# Patient Record
Sex: FEMALE | Race: OTHER | HISPANIC OR LATINO | URBAN - METROPOLITAN AREA
[De-identification: names, ages, dates, MRNs, and addresses within clinical notes are randomized per-mention and may not be internally consistent; named-entity substitution may affect disease eponyms.]

---

## 2018-10-30 ENCOUNTER — EMERGENCY (EMERGENCY)
Facility: HOSPITAL | Age: 53
LOS: 1 days | Discharge: HOME | End: 2018-10-30
Attending: EMERGENCY MEDICINE | Admitting: EMERGENCY MEDICINE
Payer: COMMERCIAL

## 2018-10-30 VITALS
TEMPERATURE: 98 F | WEIGHT: 216.05 LBS | DIASTOLIC BLOOD PRESSURE: 81 MMHG | SYSTOLIC BLOOD PRESSURE: 121 MMHG | HEART RATE: 61 BPM | OXYGEN SATURATION: 100 % | RESPIRATION RATE: 16 BRPM

## 2018-10-30 DIAGNOSIS — E11.9 TYPE 2 DIABETES MELLITUS WITHOUT COMPLICATIONS: ICD-10-CM

## 2018-10-30 DIAGNOSIS — R42 DIZZINESS AND GIDDINESS: ICD-10-CM

## 2018-10-30 DIAGNOSIS — Z79.899 OTHER LONG TERM (CURRENT) DRUG THERAPY: ICD-10-CM

## 2018-10-30 DIAGNOSIS — Z79.84 LONG TERM (CURRENT) USE OF ORAL HYPOGLYCEMIC DRUGS: ICD-10-CM

## 2018-10-30 DIAGNOSIS — E03.9 HYPOTHYROIDISM, UNSPECIFIED: ICD-10-CM

## 2018-10-30 LAB
ALBUMIN SERPL ELPH-MCNC: 4.3 G/DL — SIGNIFICANT CHANGE UP (ref 3.3–5)
ALP SERPL-CCNC: 57 U/L — SIGNIFICANT CHANGE UP (ref 40–120)
ALT FLD-CCNC: 13 U/L — SIGNIFICANT CHANGE UP (ref 10–45)
ANION GAP SERPL CALC-SCNC: 18 MMOL/L — HIGH (ref 5–17)
AST SERPL-CCNC: 12 U/L — SIGNIFICANT CHANGE UP (ref 10–40)
BASOPHILS NFR BLD AUTO: 0.3 % — SIGNIFICANT CHANGE UP (ref 0–2)
BILIRUB SERPL-MCNC: 0.3 MG/DL — SIGNIFICANT CHANGE UP (ref 0.2–1.2)
BUN SERPL-MCNC: 19 MG/DL — SIGNIFICANT CHANGE UP (ref 7–23)
CALCIUM SERPL-MCNC: 10.2 MG/DL — SIGNIFICANT CHANGE UP (ref 8.4–10.5)
CHLORIDE SERPL-SCNC: 100 MMOL/L — SIGNIFICANT CHANGE UP (ref 96–108)
CO2 SERPL-SCNC: 20 MMOL/L — LOW (ref 22–31)
CREAT SERPL-MCNC: 0.78 MG/DL — SIGNIFICANT CHANGE UP (ref 0.5–1.3)
EOSINOPHIL NFR BLD AUTO: 3 % — SIGNIFICANT CHANGE UP (ref 0–6)
GLUCOSE SERPL-MCNC: 135 MG/DL — HIGH (ref 70–99)
HCT VFR BLD CALC: 41.4 % — SIGNIFICANT CHANGE UP (ref 34.5–45)
HGB BLD-MCNC: 13.3 G/DL — SIGNIFICANT CHANGE UP (ref 11.5–15.5)
LYMPHOCYTES # BLD AUTO: 29 % — SIGNIFICANT CHANGE UP (ref 13–44)
MCHC RBC-ENTMCNC: 26.9 PG — LOW (ref 27–34)
MCHC RBC-ENTMCNC: 32.1 G/DL — SIGNIFICANT CHANGE UP (ref 32–36)
MCV RBC AUTO: 83.8 FL — SIGNIFICANT CHANGE UP (ref 80–100)
MONOCYTES NFR BLD AUTO: 8.5 % — SIGNIFICANT CHANGE UP (ref 2–14)
NEUTROPHILS NFR BLD AUTO: 59.2 % — SIGNIFICANT CHANGE UP (ref 43–77)
PLATELET # BLD AUTO: 246 K/UL — SIGNIFICANT CHANGE UP (ref 150–400)
POTASSIUM SERPL-MCNC: 4.2 MMOL/L — SIGNIFICANT CHANGE UP (ref 3.5–5.3)
POTASSIUM SERPL-SCNC: 4.2 MMOL/L — SIGNIFICANT CHANGE UP (ref 3.5–5.3)
PROT SERPL-MCNC: 7.7 G/DL — SIGNIFICANT CHANGE UP (ref 6–8.3)
RBC # BLD: 4.94 M/UL — SIGNIFICANT CHANGE UP (ref 3.8–5.2)
RBC # FLD: 14.4 % — SIGNIFICANT CHANGE UP (ref 10.3–16.9)
SODIUM SERPL-SCNC: 138 MMOL/L — SIGNIFICANT CHANGE UP (ref 135–145)
TROPONIN T SERPL-MCNC: <0.01 NG/ML — SIGNIFICANT CHANGE UP (ref 0–0.01)
WBC # BLD: 9.8 K/UL — SIGNIFICANT CHANGE UP (ref 3.8–10.5)
WBC # FLD AUTO: 9.8 K/UL — SIGNIFICANT CHANGE UP (ref 3.8–10.5)

## 2018-10-30 PROCEDURE — 99284 EMERGENCY DEPT VISIT MOD MDM: CPT

## 2018-10-30 PROCEDURE — 36415 COLL VENOUS BLD VENIPUNCTURE: CPT

## 2018-10-30 PROCEDURE — 84484 ASSAY OF TROPONIN QUANT: CPT

## 2018-10-30 PROCEDURE — 80053 COMPREHEN METABOLIC PANEL: CPT

## 2018-10-30 PROCEDURE — 99283 EMERGENCY DEPT VISIT LOW MDM: CPT | Mod: 25

## 2018-10-30 PROCEDURE — 85025 COMPLETE CBC W/AUTO DIFF WBC: CPT

## 2018-10-30 PROCEDURE — 82962 GLUCOSE BLOOD TEST: CPT

## 2018-10-30 RX ORDER — SODIUM CHLORIDE 9 MG/ML
1000 INJECTION INTRAMUSCULAR; INTRAVENOUS; SUBCUTANEOUS ONCE
Qty: 0 | Refills: 0 | Status: COMPLETED | OUTPATIENT
Start: 2018-10-30 | End: 2018-10-30

## 2018-10-30 RX ORDER — LEVOTHYROXINE SODIUM 125 MCG
0 TABLET ORAL
Qty: 0 | Refills: 0 | COMMUNITY

## 2018-10-30 RX ORDER — METFORMIN HYDROCHLORIDE 850 MG/1
1 TABLET ORAL
Qty: 0 | Refills: 0 | COMMUNITY

## 2018-10-30 RX ADMIN — SODIUM CHLORIDE 2000 MILLILITER(S): 9 INJECTION INTRAMUSCULAR; INTRAVENOUS; SUBCUTANEOUS at 20:30

## 2018-10-30 NOTE — ED PROVIDER NOTE - OBJECTIVE STATEMENT
pt with hx DM2 HLD hypothyroidism was at her office today around 3 pm sitting at desk when she suddenly began to feel lightheaded and had waves of chills through her body.  She started to improve slightly after going out for fresh air, but still felt generally "lousy" for about 2 hours.  She thought she might be hypoglycemic but her blood sugar was never checked, and she has never had documented hypoglycemia before.  There was no sweating, no syncope, no headache, no vertigo, no chest pain, no palpitations, no SOB.  Ate lunch 130 pm. She feels better now but still a little nauseated and minimally lightheaded.  She has not been recently ill.  Had a similar episode about a year ago, followed up with Dr Plunkett and had normal echo.  Last stress test was negative about 1-2 yrs ago. She does not have a hx of heart disease.    PMHx as above  PSHx mastoidectomy,   Meds invokana, glucophage, lisinopril, crestor, synthroid  NKA  Social: never smoker, never cocaine/other drugs pt with hx DM2 HLD hypothyroidism was at her office today around 3 pm sitting at desk when she suddenly began to feel lightheaded and had waves of chills through her body.  She started to improve slightly after going out for fresh air, but still felt generally "lousy" for about 2 hours.  She thought she might be hypoglycemic but her blood sugar was never checked, and she has never had documented hypoglycemia before.  She also says it felt like previous panic attacks.  There was no sweating, no syncope, no headache, no vertigo, no chest pain, no palpitations, no SOB.  Ate lunch 130 pm. She feels better now but still a little nauseated and minimally lightheaded.  She has not been recently ill.  Had a similar episode about a year ago, followed up with Dr Plunkett and had normal echo.  Last stress test was negative about 1-2 yrs ago. She does not have a hx of heart disease.    PMHx as above  PSHx mastoidectomy,   Meds invokana, glucophage, lisinopril, crestor, synthroid  NKA  Social: never smoker, never cocaine/other drugs pt with hx DM2 HLD hypothyroidism was at her office today around 3 pm sitting at desk when she suddenly began to feel lightheaded and had waves of chills through her body.  She started to improve slightly after going out for fresh air, but still felt generally "lousy" for about 2 hours.  She thought she might be hypoglycemic but her blood sugar was never checked, and she has never had documented hypoglycemia before.  She also says it felt like previous panic attacks.  There was no sweating, no syncope, no headache, no vertigo, no chest pain, no palpitations, no SOB.  She never thought she was going to faint. Ate lunch 130 pm. She feels better now but still a little nauseated and minimally lightheaded.  She has not been recently ill.  Had a similar episode about a year ago, followed up with Dr Plunkett and had normal echo.  Last stress test was negative about 1-2 yrs ago. She does not have a hx of heart disease.    PMHx as above  PSHx mastoidectomy,   Meds invokana, glucophage, lisinopril, crestor, synthroid  NKA  Social: never smoker, never cocaine/other drugs

## 2018-10-30 NOTE — ED ADULT NURSE NOTE - NSIMPLEMENTINTERV_GEN_ALL_ED
Implemented All Universal Safety Interventions:  [place] to call system. Call bell, personal items and telephone within reach. Instruct patient to call for assistance. Room bathroom lighting operational. Non-slip footwear when patient is off stretcher. Physically safe environment: no spills, clutter or unnecessary equipment. Stretcher in lowest position, wheels locked, appropriate side rails in place.

## 2018-10-30 NOTE — ED PROVIDER NOTE - NS ED ROS FT
CONSTITUTIONAL: No fever, no sweats  NEURO: No headache, no syncope; No focal weakness/tingling/numbness  EYES: No visual changes  ENT: No rhinorrhea or sore throat  PULM: No cough or dyspnea  CV: No chest pain or palpitations  GI: No abdominal pain, vomiting, or diarrhea  : No dysuria, hematuria, frequency  MSK: No neck pain or back pain, no joint pain  SKIN: no rash or unusual bruising

## 2018-10-30 NOTE — ED PROVIDER NOTE - PROGRESS NOTE DETAILS
d/w Dr Plunkett.  Says patient has no known heart disease, doesn't have her record with him right now but he thinks she had a CTA coronaries in fairly recent past without memorable abnormalities.  He says if troponin negative and if no other major concerns he will see her tomorrow in the office. Patient ate a bagel sandwich and drinking water, says she feels "much better" and "pretty much back to normal."  Discussed lab results, she declined further IV fluids or lab tests, says she can hydrate orally.  She says they know her well at Dr Plunkett's office and she will call in the morning to find out what time to go to his office.  Copies of labs and EKG provided for patient to bring to Dr Plunkett. Patient ate a bagel sandwich and drinking water, says she feels "much better" and "pretty much back to normal."  Discussed lab results, she declined further IV fluids or lab tests, says she can hydrate orally.  She says they know her well at Dr Plunkett's office and she will call in the morning to find out what time to go to his office.  She will stay at her daughter's apartment in [facility].  Copies of labs and EKG provided for patient to bring to Dr Plunkett.

## 2018-10-30 NOTE — ED ADULT TRIAGE NOTE - CHIEF COMPLAINT QUOTE
Pt c/o of feeling lightheaded and sweaty around 3pm today. Pt thought her sugar might be low and had some food. FSG in triage 148. Pt takes Glucophage.

## 2018-10-30 NOTE — ED ADULT NURSE NOTE - OBJECTIVE STATEMENT
pt walked in with c/o lightheadedness and "funny feeling on her right arm" @3pm. states it was the same sx when she had hypoglycemia in the past. PMH DM. on metformin, compliant to medication. Had fries and stated relief.  in triage, denies any other complaints. no distress. awaiting MD grande.

## 2018-10-30 NOTE — ED PROVIDER NOTE - MEDICAL DECISION MAKING DETAILS
Pt with 2 hrs of lightheadedness and chills.  AVSS, no localizing infectious symptoms, no chest pain/palpitations/SOB.  No ischemia on EKG.  Clinically suspicious for vasovagal episode.  Will check labs and d/w Dr Plunkett. Pt with 2 hrs of lightheadedness and chills.  AVSS, no localizing infectious symptoms, no chest pain/palpitations/SOB.  No ischemia on EKG.  Clinically suspicious for vasovagal episode or panic attack.  Will check labs and d/w Dr Plunkett.

## 2018-10-30 NOTE — ED ADULT NURSE NOTE - CHPI ED NUR SYMPTOMS NEG
no vomiting/no chills/no tingling/no dizziness/no pain/no fever/no nausea/no weakness/no decreased eating/drinking

## 2018-10-30 NOTE — ED PROVIDER NOTE - PHYSICAL EXAMINATION
CONSTITUTIONAL: WD,WN. NAD.    SKIN: Normal color and turgor. No rash.    HEAD: NC/AT.  EYES: Conjunctiva clear. EOMI. PERRL.    ENT: Airway patent, OP without erythema, tonsillar swelling or exudate; uvula midline without swelling. Nasal mucosa clear, no rhinorrhea.   RESPIRATORY:  Breathing non-labored. No retractions or accessory muscle use.  Lungs CTA bilat.  CARDIOVASCULAR:  RRR, clear S1S2. No M/R/G.      GI:  Abdomen soft, nontender.    MSK: Neck supple with painless ROM.  No lower extremity edema or calf tenderness.  No joint swelling or ROM limitation.  NEURO: Alert and oriented; CN II-XII grossly intact. Speech clear. 5/5 strength in all extremities.  Normal balance and gait.

## 2020-10-08 ENCOUNTER — EMERGENCY (EMERGENCY)
Facility: HOSPITAL | Age: 55
LOS: 1 days | Discharge: HOME | End: 2020-10-08
Attending: EMERGENCY MEDICINE | Admitting: EMERGENCY MEDICINE
Payer: COMMERCIAL

## 2020-10-08 VITALS
WEIGHT: 229.94 LBS | OXYGEN SATURATION: 96 % | TEMPERATURE: 98 F | DIASTOLIC BLOOD PRESSURE: 68 MMHG | SYSTOLIC BLOOD PRESSURE: 120 MMHG | HEIGHT: 64 IN | HEART RATE: 67 BPM | RESPIRATION RATE: 18 BRPM

## 2020-10-08 VITALS
TEMPERATURE: 98 F | DIASTOLIC BLOOD PRESSURE: 82 MMHG | SYSTOLIC BLOOD PRESSURE: 116 MMHG | RESPIRATION RATE: 18 BRPM | OXYGEN SATURATION: 100 % | HEART RATE: 67 BPM

## 2020-10-08 PROBLEM — E11.9 TYPE 2 DIABETES MELLITUS WITHOUT COMPLICATIONS: Chronic | Status: ACTIVE | Noted: 2018-10-30

## 2020-10-08 PROBLEM — E03.9 HYPOTHYROIDISM, UNSPECIFIED: Chronic | Status: ACTIVE | Noted: 2018-10-30

## 2020-10-08 LAB
ALBUMIN SERPL ELPH-MCNC: 4.9 G/DL — SIGNIFICANT CHANGE UP (ref 3.3–5)
ALP SERPL-CCNC: 76 U/L — SIGNIFICANT CHANGE UP (ref 40–120)
ALT FLD-CCNC: 13 U/L — SIGNIFICANT CHANGE UP (ref 10–45)
ANION GAP SERPL CALC-SCNC: 14 MMOL/L — SIGNIFICANT CHANGE UP (ref 5–17)
APPEARANCE UR: CLEAR — SIGNIFICANT CHANGE UP
AST SERPL-CCNC: 12 U/L — SIGNIFICANT CHANGE UP (ref 10–40)
BACTERIA # UR AUTO: PRESENT /HPF
BASOPHILS # BLD AUTO: 0.02 K/UL — SIGNIFICANT CHANGE UP (ref 0–0.2)
BASOPHILS NFR BLD AUTO: 0.2 % — SIGNIFICANT CHANGE UP (ref 0–2)
BILIRUB SERPL-MCNC: 0.4 MG/DL — SIGNIFICANT CHANGE UP (ref 0.2–1.2)
BILIRUB UR-MCNC: NEGATIVE — SIGNIFICANT CHANGE UP
BUN SERPL-MCNC: 21 MG/DL — SIGNIFICANT CHANGE UP (ref 7–23)
CALCIUM SERPL-MCNC: 10.2 MG/DL — SIGNIFICANT CHANGE UP (ref 8.4–10.5)
CHLORIDE SERPL-SCNC: 101 MMOL/L — SIGNIFICANT CHANGE UP (ref 96–108)
CO2 SERPL-SCNC: 25 MMOL/L — SIGNIFICANT CHANGE UP (ref 22–31)
COLOR SPEC: YELLOW — SIGNIFICANT CHANGE UP
COMMENT - URINE: SIGNIFICANT CHANGE UP
CREAT SERPL-MCNC: 0.97 MG/DL — SIGNIFICANT CHANGE UP (ref 0.5–1.3)
DIFF PNL FLD: NEGATIVE — SIGNIFICANT CHANGE UP
EOSINOPHIL # BLD AUTO: 0.16 K/UL — SIGNIFICANT CHANGE UP (ref 0–0.5)
EOSINOPHIL NFR BLD AUTO: 1.6 % — SIGNIFICANT CHANGE UP (ref 0–6)
EPI CELLS # UR: ABNORMAL /HPF (ref 0–5)
GLUCOSE SERPL-MCNC: 162 MG/DL — HIGH (ref 70–99)
GLUCOSE UR QL: NEGATIVE — SIGNIFICANT CHANGE UP
HCT VFR BLD CALC: 43.1 % — SIGNIFICANT CHANGE UP (ref 34.5–45)
HGB BLD-MCNC: 13.7 G/DL — SIGNIFICANT CHANGE UP (ref 11.5–15.5)
HYALINE CASTS # UR AUTO: ABNORMAL /LPF (ref 0–2)
IMM GRANULOCYTES NFR BLD AUTO: 0.3 % — SIGNIFICANT CHANGE UP (ref 0–1.5)
KETONES UR-MCNC: NEGATIVE — SIGNIFICANT CHANGE UP
LEUKOCYTE ESTERASE UR-ACNC: ABNORMAL
LIDOCAIN IGE QN: 45 U/L — SIGNIFICANT CHANGE UP (ref 7–60)
LYMPHOCYTES # BLD AUTO: 2.62 K/UL — SIGNIFICANT CHANGE UP (ref 1–3.3)
LYMPHOCYTES # BLD AUTO: 26.4 % — SIGNIFICANT CHANGE UP (ref 13–44)
MCHC RBC-ENTMCNC: 26.7 PG — LOW (ref 27–34)
MCHC RBC-ENTMCNC: 31.8 GM/DL — LOW (ref 32–36)
MCV RBC AUTO: 84 FL — SIGNIFICANT CHANGE UP (ref 80–100)
MONOCYTES # BLD AUTO: 0.83 K/UL — SIGNIFICANT CHANGE UP (ref 0–0.9)
MONOCYTES NFR BLD AUTO: 8.4 % — SIGNIFICANT CHANGE UP (ref 2–14)
NEUTROPHILS # BLD AUTO: 6.25 K/UL — SIGNIFICANT CHANGE UP (ref 1.8–7.4)
NEUTROPHILS NFR BLD AUTO: 63.1 % — SIGNIFICANT CHANGE UP (ref 43–77)
NITRITE UR-MCNC: NEGATIVE — SIGNIFICANT CHANGE UP
NRBC # BLD: 0 /100 WBCS — SIGNIFICANT CHANGE UP (ref 0–0)
PH UR: 5.5 — SIGNIFICANT CHANGE UP (ref 5–8)
PLATELET # BLD AUTO: 260 K/UL — SIGNIFICANT CHANGE UP (ref 150–400)
POTASSIUM SERPL-MCNC: 3.9 MMOL/L — SIGNIFICANT CHANGE UP (ref 3.5–5.3)
POTASSIUM SERPL-SCNC: 3.9 MMOL/L — SIGNIFICANT CHANGE UP (ref 3.5–5.3)
PROT SERPL-MCNC: 8.5 G/DL — HIGH (ref 6–8.3)
PROT UR-MCNC: ABNORMAL MG/DL
RBC # BLD: 5.13 M/UL — SIGNIFICANT CHANGE UP (ref 3.8–5.2)
RBC # FLD: 13.7 % — SIGNIFICANT CHANGE UP (ref 10.3–14.5)
RBC CASTS # UR COMP ASSIST: < 5 /HPF — SIGNIFICANT CHANGE UP
SODIUM SERPL-SCNC: 140 MMOL/L — SIGNIFICANT CHANGE UP (ref 135–145)
SP GR SPEC: >=1.03 — SIGNIFICANT CHANGE UP (ref 1–1.03)
UROBILINOGEN FLD QL: 0.2 E.U./DL — SIGNIFICANT CHANGE UP
WBC # BLD: 9.91 K/UL — SIGNIFICANT CHANGE UP (ref 3.8–10.5)
WBC # FLD AUTO: 9.91 K/UL — SIGNIFICANT CHANGE UP (ref 3.8–10.5)
WBC UR QL: ABNORMAL /HPF

## 2020-10-08 PROCEDURE — 93005 ELECTROCARDIOGRAM TRACING: CPT

## 2020-10-08 PROCEDURE — 81001 URINALYSIS AUTO W/SCOPE: CPT

## 2020-10-08 PROCEDURE — 74177 CT ABD & PELVIS W/CONTRAST: CPT | Mod: 26

## 2020-10-08 PROCEDURE — 74177 CT ABD & PELVIS W/CONTRAST: CPT

## 2020-10-08 PROCEDURE — 83690 ASSAY OF LIPASE: CPT

## 2020-10-08 PROCEDURE — 99285 EMERGENCY DEPT VISIT HI MDM: CPT

## 2020-10-08 PROCEDURE — 36415 COLL VENOUS BLD VENIPUNCTURE: CPT

## 2020-10-08 PROCEDURE — 80053 COMPREHEN METABOLIC PANEL: CPT

## 2020-10-08 PROCEDURE — 93010 ELECTROCARDIOGRAM REPORT: CPT | Mod: NC

## 2020-10-08 PROCEDURE — 99284 EMERGENCY DEPT VISIT MOD MDM: CPT | Mod: 25

## 2020-10-08 PROCEDURE — 85025 COMPLETE CBC W/AUTO DIFF WBC: CPT

## 2020-10-08 RX ORDER — IOHEXOL 300 MG/ML
30 INJECTION, SOLUTION INTRAVENOUS ONCE
Refills: 0 | Status: COMPLETED | OUTPATIENT
Start: 2020-10-08 | End: 2020-10-08

## 2020-10-08 RX ORDER — ONDANSETRON 8 MG/1
4 TABLET, FILM COATED ORAL ONCE
Refills: 0 | Status: DISCONTINUED | OUTPATIENT
Start: 2020-10-08 | End: 2020-10-12

## 2020-10-08 RX ADMIN — IOHEXOL 30 MILLILITER(S): 300 INJECTION, SOLUTION INTRAVENOUS at 15:41

## 2020-10-08 NOTE — ED PROVIDER NOTE - ATTENDING CONTRIBUTION TO CARE
Pt w/ PMHx DM, HLD, Hypothyroidism p/w LLQ pain, intermittent, non radiating, sharp pain, onset this morning. 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, referred to Dr Gleason / colorectal, whom referred the pt to the ED. No n/v/d/c. + tolerating PO, w/o change in pain or appetite. No F/U/D or hematuria. + C-scope last year, negative. No hx diverticulitis.  Limited PE performed in the setting of the COVID10 pandemic, in efforts to limit exposure and cross-contamination  Constitutional: Well appearing, well nourished, awake, alert, oriented to person, place, time/situation and in no apparent distress.  ENMT: Airway patent.   Eyes: Clear bilaterally  Cardiac: Normal rate, regular rhythm.   Respiratory: No increased WOB, tachypnea, hypoxia, or accessory mm use. Pt speaks in full sentences.   Gastrointestinal: Abd soft, obese. + mild ttp in the LLQ. No guarding, rebound, or rigidity.   Musculoskeletal: Range of motion is not limited  Neuro: Alert and oriented x 3, face symmetric and speech fluent. Nml gross motor movement, grossly non focal   Skin: Skin normal color for race, warm, dry and intact. No evidence of rash.  Psych: Alert and oriented to person, place, time/situation. normal mood and affect. no apparent risk to self or others.  LLQ pain. DDx includes but not limited to diverticulitis, less likely renal colic, UTI, pyelo, colitis, other pathology. Check labs, UA, CT a/p, NPO. Declines analgesia. Dispo pending w/u and clinical status

## 2020-10-08 NOTE — ED ADULT NURSE NOTE - OBJECTIVE STATEMENT
Patient complains of LLQ abdominal pain without nausea, vomiting, diarrhea, fever. Pt reports pain started this morning, she took advil with relief. Abdomen large, soft, tender to LLQ palpation. Pt reports she had a colonoscopy last year and everything was normal, but that she was seen by a GI doctor that she knows and he felt she should come to the ED for evaluation for diverticulitis. Pt otherwise appears well, no acute distress noted at this time.

## 2020-10-08 NOTE — ED PROVIDER NOTE - NSFOLLOWUPINSTRUCTIONS_ED_ALL_ED_FT
You were evaluated in the ED for abdominal pain. Your blood work, urinalysis, and CT of the abdomen and pelvis showed no acute abnormalities. You had the following incidental finding:   10 mm left lower lobe of the lung, solid nodule. Recommend follow-up CT in 3 months to assess stability    The CT report is a preliminary result. IF the final report shows additional findings, you will be called. You have opted to go home before the final report.     Follow up with your regular medical doctor. Return for acutely worsening / persistent pain, fever, vomiting, blood diarrhea, or other concerning symptoms.     Abdominal Pain    Many things can cause abdominal pain. Many times, abdominal pain is not caused by a disease and will improve without treatment. Your health care provider will do a physical exam to determine if there is a dangerous cause of your pain; blood tests and imaging may help determine the cause of your pain. However, in many cases, no cause may be found and you may need further testing as an outpatient. Monitor your abdominal pain for any changes.     SEEK IMMEDIATE MEDICAL CARE IF YOU HAVE ANY OF THE FOLLOWING SYMPTOMS: worsening abdominal pain, uncontrollable vomiting, profuse diarrhea, inability to have bowel movements or pass gas, black or bloody stools, fever accompanying chest pain or back pain, or fainting. These symptoms may represent a serious problem that is an emergency. Do not wait to see if the symptoms will go away. Get medical help right away. Call 911 and do not drive yourself to the hospital.       Pulmonary Nodules    WHAT YOU NEED TO KNOW:    Pulmonary nodules are areas of abnormal tissue in your lungs. You may not have any symptoms, or you may have chest tightness, a cough, chest pain, or shortness of breath. Nodules are usually found with an x-ray or CT scan. Most nodules are not cancerous. However, it is still important for you to return for follow-up testing to monitor your condition.     DISCHARGE INSTRUCTIONS:    Call 911 for any of the following:   •You have severe shortness of breath or trouble breathing.       •Your lips or nails look blue or pale.      Return to the emergency department if:   •You cough up blood.      •You suddenly feel lightheaded or are short of breath.      •You have chest pain when you take a deep breath or cough.      •You cannot think clearly.      Contact your healthcare provider if:   •Your symptoms do not improve.      •You have new symptoms.       •You have questions or concerns about your condition or care.      Follow up with your healthcare provider: Your healthcare provider will refer you to a pulmonologist. Your pulmonologist will monitor your nodules for any change or growth. Nodules that grow quickly may require a biopsy to check for cancer. You may need to be monitored for 1 to 3 years. Write down your questions so you remember to ask them during your visits.     Do not smoke: Nicotine and other chemicals in cigarettes and cigars can cause lung damage or cancer. Stay away from others who smoke. Ask your healthcare provider for information if you or someone close to you currently smokes and needs help to quit. E-cigarettes or smokeless tobacco still contain nicotine. Talk to your healthcare provider before you use these products.

## 2020-10-08 NOTE — ED ADULT NURSE NOTE - CHPI ED NUR SYMPTOMS NEG
no diarrhea/no hematuria/no chills/no vomiting/no fever/no abdominal distension/no blood in stool/no burning urination/no dysuria/no nausea

## 2020-10-08 NOTE — ED PROVIDER NOTE - CLINICAL SUMMARY MEDICAL DECISION MAKING FREE TEXT BOX
Pt. is 54yo F presenting w/ acute onset LLQ pain w/out N/V, fevers/chills. No rebound tenderness or guarding noted on exam, no CVA tenderness. DD abdominal origins such as diverticulitis or pancreatitis vs.  (urinary colic, pyelonephritis). CBC, CMP, lipase, CT w/ IV/oral contrast, and urinalaysis. Zofran for nausea

## 2020-10-08 NOTE — ED PROVIDER NOTE - OBJECTIVE STATEMENT
Pt. is 56yo F w/ PMH DM2 and hyperthyroidism presenting with acute onset LLQ pain. Pt. awoke this morning w/ localized LLQ pain that has progressively worsened throughout the day. Pain is sharp and intermittent. She has never had pain like this before. Pt. was able to tolerate food today and had 1 normal BM. She denies N/V, fevers/chills, changes in BM, urinary frequency/dysuria, changes in weight, changes in diet, lower extremity weakness/numbness.

## 2020-10-08 NOTE — PROGRESS NOTE ADULT - SUBJECTIVE AND OBJECTIVE BOX
56 yo F acute onset of LLQ pain on awakening this AM  heavy exertion(physical activity) last PM  Normal appetite  no fever  no chills  Colonoscopy 1/20 No Tics    AVSS  Abd soft point tenderness LLQ  No rebound  NL WBC  NL H&H    CT unremarkable    Musculoskeletal Pain ?  Get official CT report  OK D/C home if CT official Normal  I will F/U with her as an Outpatient

## 2020-10-08 NOTE — CONSULT NOTE ADULT - SUBJECTIVE AND OBJECTIVE BOX
Attending: Rosibel    HPI:   55F PMHx DM, HLD, hypothyroidism, obesity, R mastoidectomy,  presents with worsening abd pain x 1 day. Pt first noted abd pain when getting out of bed this AM. Describes pain as dull, progressively worsening, localized to LLQ, worse with movement, severity 5/10, no pain meds taken today. Had been moving furniture last night so initially thought she pulled a muscle, but due to severity went to PCP's office this AM, however he was out of town so was told to visit Dr. Gleason, and based on severity of tenderness on exam was referred to ED for further evaluation. Pt last ate breakfast this AM, tolerated well, has not eaten since because she has not had time today. Denies fevers, chills, constipation, diarrhea, hematochezia/melena, CP, SOB, dysuria, recent changes in diet. Last colonoscopy was last year and reportedly wnl. In ED afebrile, VSS, WBC 9.9, CT unremarkable.     PMH: DM, HLD, hypothyroidism, obesity  PSH: R mastoidectomy,   SH: denies tobacco, alcohol and drug use. Works as an  here.  Meds: glucophage 1000, lipitor 10, synthroid 75, zetia 10  Allergies: none    T(C): 36.7 (10-08-20 @ 14:37), Max: 36.7 (10-08-20 @ 14:37)  HR: 67 (10-08-20 @ 14:37) (67 - 67)  BP: 120/68 (10-08-20 @ 14:37) (120/68 - 120/68)  RR: 18 (10-08-20 @ 14:37) (18 - 18)  SpO2: 96% (10-08-20 @ 14:37) (96% - 96%)    GENERAL: NAD, Resting comfortably in bed, awake, opens eyes spontaneously  HEENT: NCAT, MMM, Normal conjunctiva, PERRL  RESP: Nonlabored breathing, No respiratory distress  CARD: Normal rate, Normal peripheral perfusion  GI: Soft, obese, ND, moderate and focal LLQ TTP, No guarding, No rebound tenderness  EXTREM: WWP, No edema, No gross deformity of extremities  SKIN: No rashes, no lesions  NEURO: AAOx3, No focal motor or sensory deficits  PSYCH: Affect and characteristics of appearance, verbalizations, behaviors are appropriate    LABS:                        13.7   9.91  )-----------( 260      ( 08 Oct 2020 15:41 )             43.1     10    140  |  101  |  21  ----------------------------<  162<H>  3.9   |  25  |  0.97    Ca    10.2      08 Oct 2020 15:41    TPro  8.5<H>  /  Alb  4.9  /  TBili  0.4  /  DBili  x   /  AST  12  /  ALT  13  /  AlkPhos  76  10-08      LIVER FUNCTIONS - ( 08 Oct 2020 15:41 )  Alb: 4.9 g/dL / Pro: 8.5 g/dL / ALK PHOS: 76 U/L / ALT: 13 U/L / AST: 12 U/L / GGT: x             RADIOLOGY & ADDITIONAL STUDIES:  CT Abdomen and Pelvis w/ Oral Cont and w/ IV Cont:   ******PRELIMINARY REPORT******    ******PRELIMINARY REPORT******            EXAM:  CT ABDOMEN AND PELVIS OC IC                          PROCEDURE DATE:  10/08/2020    ******PRELIMINARY REPORT******    ******PRELIMINARY REPORT******              INTERPRETATION:  CT SCAN OF ABDOMEN AND PELVIS    History: Left lower quadrant pain, rule out diverticulitis.    Technique: CT scan of abdomen and pelvis was performed from lung bases through symphysis pubis. Intravenous and oral contrast material were utilized. Axial, sagittal and coronal reformatted images were reviewed.    Comparison: None.    Findings:    Lower chest: 11 by 9 mm solid nodule in the left lower lobe.    Liver:  Hepatomegaly, measuring 26 cm in craniocaudal dimension.    Gallbladder: No radiopaque stones gallbladder.    Spleen:  Normal.    Pancreas:  Normal.    Adrenal glands:  Normal.    Kidneys: Normal.    Adenopathy:  No lymphadenopathy in abdomen or pelvis.    Ascites: None.    Gastrointestinal tract: Normal.    Vessels: Normal.    Pelvic organs: Unremarkable.    Soft tissues: Tiny fat-containing umbilical hernia.    Bones: Mild degenerative changes.      Impression:  1.  No acute intra-abdominal pathology to explain left lower quadrant.  2.  Hepatomegaly.  3.  10 mm left lower lobe solid nodule. Recommend follow-up CT in 3 months.    Thank you for the opportunity to participate in the care of this patient.  ******PRELIMINARY REPORT******    ******PRELIMINARY REPORT******          STEPH ARANGO M.D., RADIOLOGY RESIDENT (10-08-20 @ 17:39)      Assessment: 55F PMHx DM, HLD, hypothyroidism, obesity, R mastoidectomy,  presents with worsening abd pain x 1 day, likely musculoskeletal in nature given recent furniture moving and normal labs/imaging/vitals in ED.    Recommendations:  - No surgical indications at this time  - F/u final read of imaging, if normal recommend discharge with follow up in Dr. Gleason's office  - CT in 3 mos to follow-up pulmonary nodule  - Discussed with attending surgeon

## 2020-10-12 DIAGNOSIS — R10.32 LEFT LOWER QUADRANT PAIN: ICD-10-CM

## 2020-10-12 DIAGNOSIS — Z79.4 LONG TERM (CURRENT) USE OF INSULIN: ICD-10-CM

## 2020-10-12 DIAGNOSIS — E11.9 TYPE 2 DIABETES MELLITUS WITHOUT COMPLICATIONS: ICD-10-CM

## 2021-06-29 NOTE — ED ADULT TRIAGE NOTE - BP NONINVASIVE SYSTOLIC (MM HG)
Progress Note - Behavioral Health   Rejius Espinoza 49 y o  female MRN: [id number]  Unit/Bed#: [facility] 112-01 Encounter: [id number]    Assessment/Plan   Principal Problem:    Schizoaffective disorder, bipolar type ([address] )  Active Problems:    Post-traumatic stress disorder, chronic    Medical clearance for psychiatric admission    Mild intermittent asthma without complication    Ear problem, bilateral    Gastroesophageal reflux disease    Right foot pain    Ear problem, right      Behavior over the last 24 hours:  improved  Sleep: normal  Appetite: normal  Medication side effects: No  ROS: no complaints    Mental Status Evaluation:  Appearance:  age appropriate and casually dressed   Behavior:  cooperative   Speech:  normal pitch and normal volume   Mood:  constricted   Affect:  constricted   Thought Process:  goal directed and logical   Thought Content:  no delusions   Perceptual Disturbances: tactile hallucinations   Risk Potential: Suicidal Ideations none  Homicidal Ideations none  Potential for Aggression No   Sensorium:  person, place and time/date   Memory:  recent and remote memory grossly intact   Consciousness:  alert and awake    Attention: attention span appeared shorter than expected for age   Insight:  limited   Judgment: limited   Gait/Station: normal gait/station and normal balance   Motor Activity: no abnormal movements     Progress Toward Goals: Harris remains compliant with her medications and denies side effects  She participated on treatment team meeting today and stated she still has tactile hallucinations but is learning to use her coping skills to manage her symptoms  She started attending [employer] meetings  She stated she has no desire to drink and she is happy she able to get the help she she needed  Agrees to continue current treatment  Recommended Treatment: Continue with group therapy, milieu therapy and occupational therapy        Risks, benefits and possible side effects of Medications: Risks, benefits, and possible side effects of medications explained to patient and patient verbalizes understanding        Medications:   all current active meds have been reviewed, continue current psychiatric medications and current meds:   Current Facility-Administered Medications   Medication Dose Route Frequency    acetaminophen (TYLENOL) tablet 650 mg  650 mg Oral Q6H PRN    acetaminophen (TYLENOL) tablet 650 mg  650 mg Oral Q4H PRN    acetaminophen (TYLENOL) tablet 975 mg  975 mg Oral Q6H PRN    al mag oxide-diphenhydramine-lidocaine viscous (MAGIC MOUTHWASH) suspension 10 mL  10 mL Swish & Swallow Q4H PRN    albuterol (PROVENTIL HFA,VENTOLIN HFA) inhaler 2 puff  2 puff Inhalation Q6H PRN    aluminum-magnesium hydroxide-simethicone (MYLANTA) oral suspension 15 mL  15 mL Oral Q4H PRN    benztropine (COGENTIN) injection 1 mg  1 mg Intramuscular Q4H PRN Max 6/day    benztropine (COGENTIN) tablet 1 mg  1 mg Oral Q4H PRN Max 6/day    benztropine (COGENTIN) tablet 1 mg  1 mg Oral BID    calcium carbonate (TUMS) chewable tablet 500 mg  500 mg Oral TID PRN    Diclofenac Sodium (VOLTAREN) 1 % topical gel 2 g  2 g Topical 4x Daily PRN    diphenhydrAMINE (BENADRYL) tablet 25 mg  25 mg Oral HS    fenofibrate (TRICOR) tablet 145 mg  145 mg Oral Daily    gabapentin (NEURONTIN) capsule 600 mg  600 mg Oral 4x Daily    haloperidol (HALDOL) tablet 1 mg  1 mg Oral 4x Daily    haloperidol (HALDOL) tablet 5 mg  5 mg Oral Q6H PRN    hydrOXYzine HCL (ATARAX) tablet 25 mg  25 mg Oral Q6H PRN Max 4/day    hydrOXYzine HCL (ATARAX) tablet 50 mg  50 mg Oral Q6H PRN Max 4/day    lidocaine (LIDODERM) 5 % patch 1 patch  1 patch Topical Daily    LORazepam (ATIVAN) injection 1 mg  1 mg Intramuscular Q6H PRN    LORazepam (ATIVAN) tablet 0 5 mg  0 5 mg Oral 4x Daily    magnesium hydroxide (MILK OF MAGNESIA) oral suspension 30 mL  30 mL Oral Daily PRN    methocarbamol (ROBAXIN) tablet 500 mg  500 mg Oral Q8H PRN    nicotine (NICODERM CQ) 21 mg/24 hr TD 24 hr patch 1 patch  1 patch Transdermal Daily    nicotine polacrilex (NICORETTE) gum 4 mg  4 mg Oral Q2H PRN    OLANZapine (ZyPREXA ZYDIS) dispersible tablet 2 5 mg  2 5 mg Oral Q8H PRN    OLANZapine (ZyPREXA ZYDIS) dispersible tablet 5 mg  5 mg Oral Q3H PRN    OLANZapine (ZyPREXA ZYDIS) dispersible tablet 7 5 mg  7 5 mg Oral Q3H PRN    OLANZapine (ZyPREXA) IM injection 10 mg  10 mg Intramuscular Q3H PRN    OLANZapine (ZyPREXA) IM injection 5 mg  5 mg Intramuscular Q3H PRN    OLANZapine (ZyPREXA) tablet 10 mg  10 mg Oral HS    OLANZapine (ZyPREXA) tablet 15 mg  15 mg Oral Daily    paliperidone palmitate ER (INVEGA) IM injection 234 mg  234 mg Intramuscular Q30 Days    pantoprazole (PROTONIX) EC tablet 40 mg  40 mg Oral Early Morning    Pramox-PE-Glycerin-Petrolatum (PREPARATION H MAX) 1-0 25-14 4-15 % rectal cream 1 application  1 application Rectal 4x Daily PRN    prazosin (MINIPRESS) capsule 1 mg  1 mg Oral HS    psyllium (METAMUCIL) 1 packet  1 packet Oral Daily    sertraline (ZOLOFT) tablet 100 mg  100 mg Oral Daily     Labs: I have personally reviewed all pertinent laboratory/tests results     Most Recent Labs:   Lab Results   Component Value Date    WBC 6 80 04/09/2021    RBC 4 32 04/09/2021    HGB 13 0 04/09/2021    HCT 39 5 04/09/2021     04/09/2021    RDW 14 3 04/09/2021    NEUTROABS 7 10 (H) 03/16/2021    SODIUM 139 05/03/2021    K 4 4 05/03/2021     05/03/2021    CO2 30 05/03/2021    BUN 14 05/03/2021    CREATININE 0 61 05/03/2021    GLUC 94 05/03/2021    GLUF 94 05/03/2021    CALCIUM 9 8 05/03/2021    AST 25 05/03/2021    ALT 18 05/03/2021    ALKPHOS 56 05/03/2021    TP 7 2 05/03/2021    ALB 4 2 05/03/2021    TBILI 0 25 05/03/2021    CHOLESTEROL 300 (H) 03/31/2021    HDL 57 03/31/2021    TRIG 658 (H) 03/31/2021    LDLCALC  03/31/2021      Comment:      Calculated LDL invalid, triglycerides >400 mg/dl    NONHDLC 147 08/23/2020    AMMONIA 28 10/27/2017    [id number] 3 810 04/09/2021    FREET4 0 89 03/24/2016    PREGSERUM Negative 10/21/2019    HCG <2 00 04/10/2014    RPR Non-Reactive 03/31/2021    HGBA1C 5 0 08/06/2019    EAG 97 08/06/2019       Counseling / Coordination of Care  Total floor / unit time spent today n/a minutes  Greater than 50% of total time was spent with the patient and / or family counseling and / or coordination of care   A description of the counseling / coordination of care: 120

## 2022-01-19 NOTE — ED ADULT NURSE NOTE - CHPI ED NUR SEVERITY2
[Fatigue] : fatigue [SOB on Exertion] : shortness of breath during exertion [Negative] : Allergic/Immunologic [FreeTextEntry7] : Patient has abdominal discomfort when ascites is not drained. PAIN SCALE 5 OF 10.

## 2022-03-03 NOTE — ED PROVIDER NOTE - ENDOCRINE [+], MLM

## 2022-08-08 ENCOUNTER — APPOINTMENT (OUTPATIENT)
Dept: ENDOCRINOLOGY | Facility: CLINIC | Age: 57
End: 2022-08-08

## 2022-08-08 VITALS
HEART RATE: 63 BPM | SYSTOLIC BLOOD PRESSURE: 131 MMHG | BODY MASS INDEX: 41.27 KG/M2 | WEIGHT: 233 LBS | DIASTOLIC BLOOD PRESSURE: 77 MMHG

## 2022-08-08 DIAGNOSIS — E11.9 TYPE 2 DIABETES MELLITUS W/OUT COMPLICATIONS: ICD-10-CM

## 2022-08-08 LAB
GLUCOSE BLDC GLUCOMTR-MCNC: 63
HBA1C MFR BLD HPLC: 7.9

## 2022-08-08 PROCEDURE — 99204 OFFICE O/P NEW MOD 45 MIN: CPT | Mod: 25

## 2022-08-08 PROCEDURE — 83036 HEMOGLOBIN GLYCOSYLATED A1C: CPT | Mod: QW

## 2022-08-08 PROCEDURE — 82962 GLUCOSE BLOOD TEST: CPT

## 2022-08-08 RX ORDER — BLOOD SUGAR DIAGNOSTIC
STRIP MISCELLANEOUS
Qty: 50 | Refills: 0 | Status: ACTIVE | COMMUNITY
Start: 2021-12-28

## 2022-08-08 RX ORDER — LEVOTHYROXINE SODIUM 0.15 MG/1
150 TABLET ORAL
Refills: 0 | Status: ACTIVE | COMMUNITY
Start: 2022-06-15

## 2022-08-08 RX ORDER — LISINOPRIL 5 MG/1
5 TABLET ORAL
Qty: 30 | Refills: 0 | Status: ACTIVE | COMMUNITY
Start: 2022-07-08

## 2022-08-08 RX ORDER — ERGOCALCIFEROL 1.25 MG/1
1.25 MG CAPSULE, LIQUID FILLED ORAL
Qty: 4 | Refills: 0 | Status: ACTIVE | COMMUNITY
Start: 2022-07-08

## 2022-08-08 RX ORDER — LANCETS 33 GAUGE
EACH MISCELLANEOUS
Qty: 100 | Refills: 1 | Status: ACTIVE | COMMUNITY
Start: 2022-08-08 | End: 1900-01-01

## 2022-08-08 RX ORDER — BLOOD SUGAR DIAGNOSTIC
STRIP MISCELLANEOUS DAILY
Qty: 1 | Refills: 1 | Status: ACTIVE | COMMUNITY
Start: 2022-08-08 | End: 1900-01-01

## 2022-08-08 RX ORDER — FLASH GLUCOSE SENSOR
KIT MISCELLANEOUS
Qty: 2 | Refills: 5 | Status: ACTIVE | COMMUNITY
Start: 2022-08-08 | End: 1900-01-01

## 2022-08-08 NOTE — HISTORY OF PRESENT ILLNESS
[FreeTextEntry1] : Diabetes for over 30 years.  Had gestational diabetes with both children (older one is now 34 years old).  Mother and 4 of 11 siblings have diabetes.\par She had done well with Tanzeum until it was no longer available (taken off the market), and then gained 30 lb during pandemic (baking own bread).\par Sugars range 140-180 in the morning.\par She exercises 3x/week but also walks a lot during her commute and lives on 4th floor (takes stairs)\par no polyuria, polydipsia, SOB, chest pain, or neuropathy symptoms \par up to date with ophtho, no h/o laser or injections to eyes\par sees podiatry every 6months\par hypothyroidism diagnosed after 2nd pregnancy.   sister has thyroid disease \par labs reviewed from 5/22:  A1c 7.8%\par \par Meds\par metformin 1g bid\par levothyroxine 150mcg\par simvastatin 40mg, Zetia 10mg

## 2022-08-08 NOTE — REASON FOR VISIT
[Initial Evaluation] : an initial evaluation [DM Type 2] : DM Type 2 [FreeTextEntry2] : Dr Ortiz Drew

## 2022-08-08 NOTE — PHYSICAL EXAM
[Alert] : alert [No Acute Distress] : no acute distress [No Proptosis] : no proptosis [No Lid Lag] : no lid lag [Normal Hearing] : hearing was normal [No LAD] : no lymphadenopathy [Thyroid Not Enlarged] : the thyroid was not enlarged [Clear to Auscultation] : lungs were clear to auscultation bilaterally [Normal S1, S2] : normal S1 and S2 [Regular Rhythm] : with a regular rhythm [No Edema] : no peripheral edema [Pedal Pulses Normal] : the pedal pulses are present [Normal Sensation on Monofilament Testing] : normal sensation on monofilament testing of lower extremities [Normal Affect] : the affect was normal [Normal Mood] : the mood was normal [Foot Ulcers] : no foot ulcers [de-identified] : no onychomycoses, mild acanthosis nigricans

## 2022-08-08 NOTE — CONSULT LETTER
[Dear  ___] : Dear  [unfilled], [Consult Letter:] : I had the pleasure of evaluating your patient, [unfilled]. [Please see my note below.] : Please see my note below. [Consult Closing:] : Thank you very much for allowing me to participate in the care of this patient.  If you have any questions, please do not hesitate to contact me. [Sincerely,] : Sincerely, [FreeTextEntry1] : Ms. Pereyra has long standing diabetes with suboptimal A1c. I recommended restarting GLP1 agonist (Ozempic), in addition to continuing metformin.\par  [FreeTextEntry3] : Celia Cox MD\par Division of Endocrinology\par [facility] Physician [facility]

## 2022-08-08 NOTE — ASSESSMENT
[FreeTextEntry1] : Diabetes, suboptimal.  Goal A1c < 7%.  Obesity BMI 41.\par Recommended restarting GLP1 agonist, which she did well on before.   Start Ozempic 0.25mg/week for 3 weeks and then titrate to 0.5mg/week.  Advised to eat slowly to prevent potential nausea and GERD side effects.  Overeating also may increase symptoms. \par Continue metformin\par Glucose goals reviewed: am goal < 140 and bedtime goal < 180.   OK to try [employer] CGM, if covered and if she wants.  Explained that CGM has 10-20% error on glucose readings, and there is 15-20 min delay in glucose reading when glucose is increasing or decreasing, so if patient suspects hypoglycemia, need to confirm with glucometer. Advised pt to download [employer] bailee to use phone as reader.   Verio  meter given (she needed new glucometer also)\par \par Hyperlipidemia.  LDL goal < 70\par Suggested changing statin from simvastatin to rosuvastatin (she was on this before) for more LDL lowering.  SHe will discuss with PCP (seeing later this week)\par RTO 3 months

## 2022-08-08 NOTE — DATA REVIEWED
[FreeTextEntry1] : 5/22  A1c 7.8%, tot chol 204, trig 202, HDL 45, , TSH 1.73, 25D 57.4\par 7/20  A1c 7.6%, tot chol 179, trig 184, HDL 42, , 25D 50.7, TSH 4.58

## 2022-09-28 ENCOUNTER — APPOINTMENT (OUTPATIENT)
Dept: UROLOGY | Facility: CLINIC | Age: 57
End: 2022-09-28

## 2022-09-28 VITALS
DIASTOLIC BLOOD PRESSURE: 77 MMHG | SYSTOLIC BLOOD PRESSURE: 125 MMHG | BODY MASS INDEX: 39.69 KG/M2 | TEMPERATURE: 98.2 F | WEIGHT: 224 LBS | HEIGHT: 63 IN | HEART RATE: 82 BPM

## 2022-09-28 DIAGNOSIS — R39.9 UNSPECIFIED SYMPTOMS AND SIGNS INVOLVING THE GENITOURINARY SYSTEM: ICD-10-CM

## 2022-09-28 DIAGNOSIS — Z78.9 OTHER SPECIFIED HEALTH STATUS: ICD-10-CM

## 2022-09-28 LAB
BILIRUB UR QL STRIP: NEGATIVE
GLUCOSE UR-MCNC: NEGATIVE
HCG UR QL: 0.2 EU/DL
HGB UR QL STRIP.AUTO: NEGATIVE
KETONES UR-MCNC: NEGATIVE
LEUKOCYTE ESTERASE UR QL STRIP: NORMAL
NITRITE UR QL STRIP: NEGATIVE
PH UR STRIP: 5.5
PROT UR STRIP-MCNC: NEGATIVE
SP GR UR STRIP: 1.02

## 2022-09-28 PROCEDURE — 99204 OFFICE O/P NEW MOD 45 MIN: CPT | Mod: 25

## 2022-09-28 PROCEDURE — 81003 URINALYSIS AUTO W/O SCOPE: CPT | Mod: QW

## 2022-09-28 PROCEDURE — 51798 US URINE CAPACITY MEASURE: CPT

## 2022-09-28 NOTE — HISTORY OF PRESENT ILLNESS
[FreeTextEntry1] : 57 year old woman with diabetes presents with one week of LUTS.\par \par She developed suprapubic pressure and pain associated with increased urgency one week ago. Her PCP prescribed cipro or 6 days, which has not provided any relief or change in symptoms. She tried taking Azo, which did not help with symptoms.  No urine culture available prior to starting antibiotics. No fevers, chills, dysuria, hematuria, flank pain. She does have some relief of symptoms after urinating, however, it is short lived. No change in bowel habits or GI symptoms. Patient is post-menopausal, last gyn exam was one year ago. No history of fibroids or ovarian cysts. No recent vaginal bleeding. No history of UTIs or STDs. She drinks two cups of coffee daily, which is the same as prior.\par \par PVR 3 cc\par \par UA dipstick with small leukocytes

## 2022-09-28 NOTE — ASSESSMENT
[FreeTextEntry1] : 57 year old woman with history of diabetes presents with one week of suprapubic pressure, suprapbic pain and increased urgency. Symptoms did not improve with cipro or Azo. Differential includes resistant UTI, pelvic floor dysfunction, overactive bladder, interstitial cystitis, gynecologic etiology. We discussed evaluation with urine culture and treatment for UTI if the urine culture is positive. We discussed scheduling follow up with gynecology for evaluation and routine care. Given short duration of symptoms, it is too early to diagnose OAB or interstitial cystitis. It is very possible that the symptoms will resolve on their own over time. If they persist, we could consider treatment with anti-cholinergic for bladder relaxation. \par  - UA, UCx\par  - Gyn evaluation\par  - Consider anti-cholinergic if symptoms persist\par  - Minimize caffeine or alcohol intake

## 2022-09-28 NOTE — LETTER BODY
[Dear  ___] : Dear  [unfilled], [Courtesy Letter:] : I had the pleasure of seeing your patient, [unfilled], in my office today. [Please see my note below.] : Please see my note below. [Consult Closing:] : Thank you very much for allowing me to participate in the care of this patient.  If you have any questions, please do not hesitate to contact me. [Sincerely,] : Sincerely, [FreeTextEntry3] : Kathryn Cardoso MD

## 2022-10-04 ENCOUNTER — APPOINTMENT (OUTPATIENT)
Dept: OBGYN | Facility: CLINIC | Age: 57
End: 2022-10-04

## 2022-10-04 LAB
APPEARANCE: ABNORMAL
BACTERIA UR CULT: NORMAL
BACTERIA: ABNORMAL
BILIRUBIN URINE: NEGATIVE
BLOOD URINE: NEGATIVE
COLOR: YELLOW
GLUCOSE QUALITATIVE U: NEGATIVE
HYALINE CASTS: 5 /LPF
KETONES URINE: NEGATIVE
LEUKOCYTE ESTERASE URINE: ABNORMAL
MICROSCOPIC-UA: NORMAL
NITRITE URINE: NEGATIVE
PH URINE: 5.5
PROTEIN URINE: NORMAL
RED BLOOD CELLS URINE: 2 /HPF
SPECIFIC GRAVITY URINE: 1.02
SQUAMOUS EPITHELIAL CELLS: 8 /HPF
UROBILINOGEN URINE: NORMAL
WHITE BLOOD CELLS URINE: 24 /HPF

## 2022-11-01 ENCOUNTER — APPOINTMENT (OUTPATIENT)
Dept: ENDOCRINOLOGY | Facility: CLINIC | Age: 57
End: 2022-11-01

## 2023-01-01 NOTE — ED PROVIDER NOTE - INTERNATIONAL TRAVEL
ASHLY ROMAN is a 5m3w old, ex-36 weeker female from [facility] with TE Fistula with esophageal atresia s/p repair and dilation at [place], and GJ dependence who presents s/p hypoxic arrest in the setting of rhinoenterovirus positive acute on chronic respiratory failure complicated by superimposed enterobacter positive pneumonia. She was intubated 12/1, extubated 12/13. Reintubated with cardiac arrest on 12/15, cannulation to [facility] ECMO 12/15 and intubated on SIMV.  The patient's clinical status possibly due to worsening sepsis and hypoxia in the setting of the respiratory illness and superimposed bacterial pneumonia.  Unclear etiology of LV dysfunction, ddx include myocarditis, myocardial stunning, sepsis.      Initial bedside echocardiogram on ECMO had shown severely depressed biventricular function with an EF of 16%, with aortic valve opening with regurgitation, mild MR.  A repeat echocardiogram had shown worsening function with the aortic valve not opening and aortic pulse pressure <15mmHg.  She is s/p trans-septal puncture and static balloon dilation of the atrial septum with a 3mm ASD with left to right shunt.  There was 2 mmHg gradient from LA to RA at end of procedure with less LA/LV dilation and mild improvement of LV function.     Most recent echocardiogram (12/24) continues to show normal biventricular function on preliminary report (detailed report to follow). ECMO was discontinued on 12/22/23 and patient has been tolerating it well. Patient remains critically ill and intubated.    Plan:  Cardio/Respiratory:  - VA ECMO 12/15 - 12/22  - s/p BAS 12/15  - Intubated;  - On Lasix gtt 1mg/kg q6h  - S/p Lasix gtt  - S/p Nicardipine gtt  - S/p Nitroprusside gtt  - Since patient has been showing consistent clinical improvement off ECMO with normalization of the echocardiogram, we will sign off of the case. Please do not hesitate to contact us if any new concerns arise.  - Patient does not need follow up echo or outpatient follow up with Cardiology unless new concerns arise    ID  - S/p Ciprofloxacin  - S/p Ceftazidime/avibactam/fluconazole    NEURO:  - S/p EEG, no seizure reported.  - Head US as per ECMO protocol did not show brain bleeds    Rest of Care per PICU ASHLY ROMAN is a 5m3w old, ex-36 weeker female from [place] with TE Fistula with esophageal atresia s/p repair and dilation at [place], and GJ dependence who presents s/p hypoxic arrest in the setting of rhinoenterovirus positive acute on chronic respiratory failure complicated by superimposed enterobacter positive pneumonia. She was intubated 12/1, extubated 12/13. Reintubated with cardiac arrest on 12/15, cannulation to VA ECMO 12/15 and intubated on SIMV.  The patient's clinical status possibly due to worsening sepsis and hypoxia in the setting of the respiratory illness and superimposed bacterial pneumonia.  Unclear etiology of LV dysfunction, ddx include myocarditis, myocardial stunning, sepsis.      Initial bedside echocardiogram on ECMO had shown severely depressed biventricular function with an EF of 16%, with aortic valve opening with regurgitation, mild MR.  A repeat echocardiogram had shown worsening function with the aortic valve not opening and aortic pulse pressure <15mmHg.  She is s/p trans-septal puncture and static balloon dilation of the atrial septum with a 3mm ASD with left to right shunt.  There was 2 mmHg gradient from LA to RA at end of procedure with less LA/LV dilation and mild improvement of LV function.     Most recent echocardiogram (12/24) continues to show normal biventricular function on preliminary report (detailed report to follow). ECMO was discontinued on 12/22/23 and patient has been tolerating it well. Patient remains critically ill and intubated.    Plan:  Cardio/Respiratory:  - VA ECMO 12/15 - 12/22  - s/p BAS 12/15  - Intubated;  - On Lasix gtt 1mg/kg q6h  - S/p Lasix gtt  - S/p Nicardipine gtt  - S/p Nitroprusside gtt  - Since patient has been showing consistent clinical improvement off ECMO with normalization of the echocardiogram, we will sign off of the case. Please do not hesitate to contact us if any new concerns arise.  - Patient does not need follow up echo or outpatient follow up with Cardiology unless new concerns arise    ID  - S/p Ciprofloxacin  - S/p Ceftazidime/avibactam/fluconazole    NEURO:  - S/p EEG, no seizure reported.  - Head US as per ECMO protocol did not show brain bleeds    Rest of Care per PICU ASHLY ROMAN is a 5m3w old, ex-36 weeker female from [facility] with TE Fistula with esophageal atresia s/p repair and dilation at [place], and GJ dependence who presents s/p hypoxic arrest in the setting of rhinoenterovirus positive acute on chronic respiratory failure complicated by superimposed enterobacter positive pneumonia. She was intubated 12/1, extubated 12/13. Reintubated with cardiac arrest on 12/15, cannulation to VA ECMO 12/15 and intubated on SIMV.  The patient's clinical status possibly due to worsening sepsis and hypoxia in the setting of the respiratory illness and superimposed bacterial pneumonia.  Unclear etiology of LV dysfunction, ddx include myocarditis, myocardial stunning, sepsis.      Initial bedside echocardiogram on ECMO had shown severely depressed biventricular function with an EF of 16%, with aortic valve opening with regurgitation, mild MR.  A repeat echocardiogram had shown worsening function with the aortic valve not opening and aortic pulse pressure <15mmHg.  She is s/p trans-septal puncture and static balloon dilation of the atrial septum with a 3mm ASD with left to right shunt.  There was 2 mmHg gradient from LA to RA at end of procedure with less LA/LV dilation and mild improvement of LV function.     Most recent echocardiogram (12/24) continues to show normal biventricular function on preliminary report (detailed report to follow). ECMO was discontinued on 12/22/23 and patient has been tolerating it well. Patient remains critically ill and intubated. From a CV perspective we will follow peripherally-- please alert us to concerns and prior to discharge.    Plan:  Cardio/Respiratory:  - VA ECMO 12/15 - 12/22  - s/p BAS 12/15  - Intubated;  - On Lasix gtt 1mg/kg q6h  - S/p Lasix gtt  - S/p Nicardipine gtt  - S/p Nitroprusside gtt  - Since patient has been showing consistent clinical improvement off ECMO with normalization of the echocardiogram, we will sign off of the case. Please do not hesitate to contact us if any new concerns arise.  - Patient should have a follow up echo prior to discharge and outpatient follow up with Cardiology in a few months for the septal defect.     ID  - S/p Ciprofloxacin  - S/p Ceftazidime/avibactam/fluconazole    NEURO:  - S/p EEG, no seizure reported.  - Head US as per ECMO protocol did not show brain bleeds    Rest of Care per PICU ASHLY ROMAN is a 5m3w old, ex-36 weeker female from [facility] with TE Fistula with esophageal atresia s/p repair and dilation at [place], and GJ dependence who presents s/p hypoxic arrest in the setting of rhinoenterovirus positive acute on chronic respiratory failure complicated by superimposed enterobacter positive pneumonia. She was intubated 12/1, extubated 12/13. Reintubated with cardiac arrest on 12/15, cannulation to VA ECMO 12/15 and intubated on SIMV.  The patient's clinical status possibly due to worsening sepsis and hypoxia in the setting of the respiratory illness and superimposed bacterial pneumonia.  Unclear etiology of LV dysfunction, ddx include myocarditis, myocardial stunning, sepsis.      Initial bedside echocardiogram on ECMO had shown severely depressed biventricular function with an EF of 16%, with aortic valve opening with regurgitation, mild MR.  A repeat echocardiogram had shown worsening function with the aortic valve not opening and aortic pulse pressure <15mmHg.  She is s/p trans-septal puncture and static balloon dilation of the atrial septum with a 3mm ASD with left to right shunt.  There was 2 mmHg gradient from LA to RA at end of procedure with less LA/LV dilation and mild improvement of LV function.     Most recent echocardiogram (12/24) continues to show normal biventricular function on preliminary report (detailed report to follow). ECMO was discontinued on 12/22/23 and patient has been tolerating it well. Patient remains critically ill and intubated. From a CV perspective we will follow peripherally-- please alert us to concerns and prior to discharge.    Plan:  Cardio/Respiratory:  - VA ECMO 12/15 - 12/22  - s/p BAS 12/15  - Intubated;  - On Lasix gtt 1mg/kg q6h  - S/p Lasix gtt  - S/p Nicardipine gtt  - S/p Nitroprusside gtt  - Since patient has been showing consistent clinical improvement off ECMO with normalization of the echocardiogram, we will sign off of the case. Please do not hesitate to contact us if any new concerns arise.  - Patient should have a follow up echo prior to discharge and outpatient follow up with Cardiology in a few months for the septal defect.     ID  - S/p Ciprofloxacin  - S/p Ceftazidime/avibactam/fluconazole    NEURO:  - S/p EEG, no seizure reported.  - Head US as per ECMO protocol did not show brain bleeds    Rest of Care per PICU No

## 2023-02-16 ENCOUNTER — RX RENEWAL (OUTPATIENT)
Age: 58
End: 2023-02-16

## 2023-05-01 ENCOUNTER — NON-APPOINTMENT (OUTPATIENT)
Age: 58
End: 2023-05-01

## 2023-05-01 ENCOUNTER — APPOINTMENT (OUTPATIENT)
Age: 58
End: 2023-05-01

## 2023-05-01 ENCOUNTER — APPOINTMENT (OUTPATIENT)
Age: 58
End: 2023-05-01
Payer: COMMERCIAL

## 2023-05-01 VITALS
DIASTOLIC BLOOD PRESSURE: 75 MMHG | RESPIRATION RATE: 16 BRPM | WEIGHT: 224 LBS | TEMPERATURE: 98 F | BODY MASS INDEX: 39.69 KG/M2 | HEART RATE: 78 BPM | SYSTOLIC BLOOD PRESSURE: 131 MMHG | OXYGEN SATURATION: 98 % | HEIGHT: 63 IN

## 2023-05-01 DIAGNOSIS — G43.009 MIGRAINE W/OUT AURA, NOT INTRACTABLE, W/OUT STATUS MIGRAINOSUS: ICD-10-CM

## 2023-05-01 DIAGNOSIS — D18.02 HEMANGIOMA OF INTRACRANIAL STRUCTURES: ICD-10-CM

## 2023-05-01 PROCEDURE — 99204 OFFICE O/P NEW MOD 45 MIN: CPT

## 2023-05-01 RX ORDER — SEMAGLUTIDE 1.34 MG/ML
2 INJECTION, SOLUTION SUBCUTANEOUS
Qty: 1.5 | Refills: 5 | Status: DISCONTINUED | COMMUNITY
Start: 2022-08-08 | End: 2023-05-01

## 2023-05-02 PROBLEM — G43.009 MIGRAINE WITHOUT AURA AND WITHOUT STATUS MIGRAINOSUS, NOT INTRACTABLE: Status: ACTIVE | Noted: 2023-05-02

## 2023-05-02 NOTE — CONSULT LETTER
[Dear  ___] : Dear  [unfilled], [Courtesy Letter:] : I had the pleasure of seeing your patient, [unfilled], in my office today. [Please see my note below.] : Please see my note below. [Consult Closing:] : Thank you very much for allowing me to participate in the care of this patient.  If you have any questions, please do not hesitate to contact me. [Sincerely,] : Sincerely, [FreeTextEntry3] : Laureano Mays MD

## 2023-05-02 NOTE — HISTORY OF PRESENT ILLNESS
[FreeTextEntry1] : ZACARIAS SALMERON is a 57 year-old right handed F who presents with headaches\par \par She had a bad headache in 12/2022, moderate/severe in severity (7-10/10), + photophobia/phonophobia, no N/V, no aura. After that headache mild/moderate for 2 weeks. No autonomic features. In retrospect, she realizes the headache started when she stopped taking Claritin and was having significant congestion.  Pt concerned due to past medical history of brain hemangioma in previous imaging. Denies diplopia, blurred vision, dysphagia, dysarthria, aphasia, focal weakness or numbness, bowel or bladder dysfunction, imbalance, falls\par \par She has seasonal allergies on claritin PMHx of HTN, DM, DLP, hypothyroidism, migraine without aura and , with PFHx of migraine in sisters who is presenting to the clinic today for new visit for headaches. Normally frontal/occipital pressure-likely, 0-1 times per month, that treats satisfactorily with Advil. Today she got one of those that resolved with Advil. Patient started to have significant headache of migraine when she was a teenager, now she does not have it..\par

## 2023-05-02 NOTE — PHYSICAL EXAM
[FreeTextEntry1] : General: this is a pleasant patient in no acute distress\par \par HEENT conjunctiva are normal, no tenderness in head\par \par CV: normal pulses, regular rate and rhythm, no peripheral edema noted\par \par Lungs: breathing is non-labored\par \par abd: soft and non-distended\par \par Mental status:\par Alert and oriented to person, place and time, normal speech and comprehension\par \par Cranial Nerves:\par extra-occular movements in tact without nystagmus, normal saccades and smooth pursuit, Face symmetric and facial strength symmetric, facial sensation symmetric, \par \par Motor: normal bulk and tone throughout. no abnormal movements.  Full 5/5 strength uppers and lower extremities proximally and distally\par \par Sensory: in tact and symmetric to vibration, light tough, temperature\par \par Cerebellar: normal finger-nose-finger bilaterally\par \par Reflexes: 2+ in the upper and lower extremities and symmetric.  toes are bilaterally downgoing.\par \par Gait: stable, able to tip toe heel and tandem\par \par Romberg: normal

## 2023-05-02 NOTE — DATA REVIEWED
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 Soft tissue filing the middle ear cavity most suggestive of otitis media, encasing the ossicles.\par CT head 01/2023: left thalamus possibly hemangioma, possibly vascular malformation.

## 2023-05-02 NOTE — ASSESSMENT
[FreeTextEntry1] : 57 year-old right handed F with seasonal allergy on claritin PMHx of HTN, DM, DLP, hypothyroidism, migraine without aura, with PFHx of migraine in sisters who is presenting to the clinic today for new visit for headaches. Normally tension headaches, 0-1 times per month, that treats satisfactorily with Advil. Patient started to have significant headache of migraine when she was a teenager, now she does not have anymore. Now she is here because she presented with headache in 12/2022, presented with prolonged migraine without aura which luckily has resolved.  In retrospect likely triggered by sinusitis. Has not had any surveillance imaging of thalamic lesion in 7 years. \par \par Plan:\par - MRI brain with and without contrast\par -continue advil PRN migraine\par \par Counselling:\par - Patient was instructed to take the abortive medications once the headache starts.\par - Discussed triggers\par - Lifestyle changes that help migraine: physical exercise, fixed meal time, fixed sleep/wake cycle, avoid smoking and highly caffeinated products.\par - abortive medication should be used no more than 2 times most weeks or occasionally 3 times to prevent medication overuse headache

## 2023-05-23 ENCOUNTER — APPOINTMENT (OUTPATIENT)
Dept: NEUROLOGY | Facility: CLINIC | Age: 58
End: 2023-05-23

## 2024-02-09 ENCOUNTER — APPOINTMENT (OUTPATIENT)
Dept: RADIOLOGY | Facility: CLINIC | Age: 59
End: 2024-02-09

## 2024-02-09 ENCOUNTER — APPOINTMENT (OUTPATIENT)
Dept: MRI IMAGING | Facility: CLINIC | Age: 59
End: 2024-02-09

## 2024-03-18 ENCOUNTER — APPOINTMENT (OUTPATIENT)
Dept: ORTHOPEDIC SURGERY | Facility: CLINIC | Age: 59
End: 2024-03-18

## 2024-04-19 ENCOUNTER — APPOINTMENT (OUTPATIENT)
Dept: ENDOCRINOLOGY | Facility: CLINIC | Age: 59
End: 2024-04-19

## 2024-04-25 ENCOUNTER — APPOINTMENT (OUTPATIENT)
Dept: RADIOLOGY | Facility: CLINIC | Age: 59
End: 2024-04-25
Payer: COMMERCIAL

## 2024-04-25 PROCEDURE — 71046 X-RAY EXAM CHEST 2 VIEWS: CPT

## 2024-05-20 ENCOUNTER — APPOINTMENT (OUTPATIENT)
Dept: NEUROLOGY | Facility: CLINIC | Age: 59
End: 2024-05-20

## 2024-05-20 NOTE — DATA REVIEWED
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 Soft tissue filing the middle ear cavity most suggestive of otitis media, encasing the ossicles.  CT head 01/2023: left thalamus possibly hemangioma, possibly vascular malformation.

## 2024-07-31 ENCOUNTER — APPOINTMENT (OUTPATIENT)
Dept: ENDOCRINOLOGY | Facility: CLINIC | Age: 59
End: 2024-07-31
Payer: COMMERCIAL

## 2024-07-31 VITALS
DIASTOLIC BLOOD PRESSURE: 88 MMHG | BODY MASS INDEX: 36.67 KG/M2 | WEIGHT: 207 LBS | SYSTOLIC BLOOD PRESSURE: 157 MMHG | HEART RATE: 66 BPM

## 2024-07-31 LAB
GLUCOSE BLDC GLUCOMTR-MCNC: 102
HBA1C MFR BLD HPLC: 6.8

## 2024-07-31 PROCEDURE — 82962 GLUCOSE BLOOD TEST: CPT

## 2024-07-31 PROCEDURE — 83036 HEMOGLOBIN GLYCOSYLATED A1C: CPT | Mod: QW

## 2024-07-31 PROCEDURE — G2211 COMPLEX E/M VISIT ADD ON: CPT | Mod: NC

## 2024-07-31 PROCEDURE — 99214 OFFICE O/P EST MOD 30 MIN: CPT

## 2024-07-31 RX ORDER — ASPIRIN 81 MG/1
81 TABLET, CHEWABLE ORAL
Refills: 0 | Status: ACTIVE | COMMUNITY

## 2024-07-31 RX ORDER — PREDNISONE 5 MG/1
5 TABLET ORAL
Refills: 0 | Status: ACTIVE | COMMUNITY

## 2024-07-31 RX ORDER — ROSUVASTATIN CALCIUM 20 MG/1
20 TABLET, FILM COATED ORAL
Refills: 0 | Status: ACTIVE | COMMUNITY

## 2024-07-31 RX ORDER — METOPROLOL SUCCINATE 25 MG/1
25 TABLET, EXTENDED RELEASE ORAL
Refills: 0 | Status: ACTIVE | COMMUNITY

## 2024-07-31 RX ORDER — SEMAGLUTIDE 1.34 MG/ML
4 INJECTION, SOLUTION SUBCUTANEOUS
Qty: 1 | Refills: 5 | Status: ACTIVE | COMMUNITY
Start: 2024-07-31 | End: 1900-01-01

## 2024-07-31 RX ORDER — CLOPIDOGREL 75 MG/1
75 TABLET, FILM COATED ORAL
Refills: 0 | Status: ACTIVE | COMMUNITY

## 2024-07-31 NOTE — PHYSICAL EXAM
[Alert] : alert [No Acute Distress] : no acute distress [No Proptosis] : no proptosis [No Lid Lag] : no lid lag [Normal Hearing] : hearing was normal [No LAD] : no lymphadenopathy [Thyroid Not Enlarged] : the thyroid was not enlarged [Clear to Auscultation] : lungs were clear to auscultation bilaterally [Normal S1, S2] : normal S1 and S2 [Regular Rhythm] : with a regular rhythm [No Edema] : no peripheral edema [Pedal Pulses Normal] : the pedal pulses are present [Normal Sensation on Monofilament Testing] : normal sensation on monofilament testing of lower extremities [Normal Affect] : the affect was normal [Normal Mood] : the mood was normal [Foot Ulcers] : no foot ulcers [de-identified] : glucose 102, fasting [de-identified] : no onychomycoses, mild acanthosis nigricans

## 2024-07-31 NOTE — DATA REVIEWED
[FreeTextEntry1] : 7/24:  A1c 6.8%,  tot chol 246, trig 212, HDL 48, .  GFR 59.    2/24:   A1c 7.2%, TSH 3.55.   5/22  A1c 7.8%, tot chol 204, trig 202, HDL 45, , TSH 1.73, 25D 57.4 7/20  A1c 7.6%, tot chol 179, trig 184, HDL 42, , 25D 50.7, TSH 4.58

## 2024-07-31 NOTE — ASSESSMENT
[FreeTextEntry1] : Diabetes, at goal.  Goal A1c < 7%.  Obesity BMI 36.   CAD.  Hypothyroid. Recommended restarting Ozempic  which also has been shown to reduce cardiac event.  Start Ozempic 1mg/week, start with half dose for 2 weeks and then increase to full dose. Continue metformin 1g bid. Continue diet and exercise efforts; further weight loss encouraged. continue statin therapy due for urine albumin normal TSH in Feb, continue levothyroxine 150mcg/day RTO 4 months

## 2024-07-31 NOTE — HISTORY OF PRESENT ILLNESS
[FreeTextEntry1] : Last here Aug 2022. Stent placed three weeks ago.  Chest was burning when she walked.   This was a wake up call for her. Her brother  unexpectedly following leg bypass surgery in January and she was emotionally eating. Around that time, she was diagnosed with PMR and was started on prednisone  30mg/day.  Now she has reduced to 5mg/day. She is feeling much better now, is walking 1 mile/day and also doing 30 minutes cardio video. She is doing time restricted eating; eating window from 12p to 7p. She has lost about 20 lb. She has eye appointment for next week. labs reviewed from  :   A1c 7.2%, TSH 3.55.  from :   (now is on statin).  GFR 59.    A1c today is 6.8%  PMH:  CAD s/p stent  PMR diabetes   Meds metformin 1g bid levothyroxine 150mcg rosuvastatin 20mg, metoprolol ER 25mg Plavix  prednisone 5mg, omeprazole vitamin D 50K weekly

## 2024-07-31 NOTE — CONSULT LETTER
[Dear  ___] : Dear  [unfilled], [Consult Letter:] : I had the pleasure of evaluating your patient, [unfilled]. [Please see my note below.] : Please see my note below. [Consult Closing:] : Thank you very much for allowing me to participate in the care of this patient.  If you have any questions, please do not hesitate to contact me. [Sincerely,] : Sincerely, [FreeTextEntry1] : Ms. Pereyra has long standing diabetes with suboptimal A1c. I recommended restarting GLP1 agonist (Ozempic), in addition to continuing metformin.\par   [FreeTextEntry3] : Celia Cox MD\par  Division of Endocrinology\par  [facility]

## 2024-08-01 LAB
CREAT SPEC-SCNC: 139 MG/DL
MICROALBUMIN 24H UR DL<=1MG/L-MCNC: 2 MG/DL
MICROALBUMIN/CREAT 24H UR-RTO: 14 MG/G

## 2024-09-09 ENCOUNTER — TRANSCRIPTION ENCOUNTER (OUTPATIENT)
Age: 59
End: 2024-09-09

## 2025-02-04 ENCOUNTER — APPOINTMENT (OUTPATIENT)
Dept: ORTHOPEDIC SURGERY | Facility: CLINIC | Age: 60
End: 2025-02-04

## 2025-02-24 ENCOUNTER — APPOINTMENT (OUTPATIENT)
Dept: NEUROLOGY | Age: 60
End: 2025-02-24
Payer: COMMERCIAL

## 2025-02-24 ENCOUNTER — NON-APPOINTMENT (OUTPATIENT)
Age: 60
End: 2025-02-24

## 2025-02-24 VITALS
BODY MASS INDEX: 37.92 KG/M2 | RESPIRATION RATE: 17 BRPM | HEIGHT: 63 IN | SYSTOLIC BLOOD PRESSURE: 145 MMHG | OXYGEN SATURATION: 100 % | DIASTOLIC BLOOD PRESSURE: 76 MMHG | TEMPERATURE: 97.8 F | HEART RATE: 67 BPM | WEIGHT: 214 LBS

## 2025-02-24 DIAGNOSIS — G44.86 CERVICOGENIC HEADACHE: ICD-10-CM

## 2025-02-24 DIAGNOSIS — G43.009 MIGRAINE W/OUT AURA, NOT INTRACTABLE, W/OUT STATUS MIGRAINOSUS: ICD-10-CM

## 2025-02-24 PROCEDURE — 99215 OFFICE O/P EST HI 40 MIN: CPT

## 2025-02-24 RX ORDER — DICLOFENAC SODIUM 10 MG/G
1 GEL TOPICAL
Qty: 1 | Refills: 2 | Status: ACTIVE | COMMUNITY
Start: 2025-02-24 | End: 1900-01-01

## 2025-03-26 ENCOUNTER — NON-APPOINTMENT (OUTPATIENT)
Age: 60
End: 2025-03-26

## 2025-03-27 ENCOUNTER — APPOINTMENT (OUTPATIENT)
Dept: UROLOGY | Facility: CLINIC | Age: 60
End: 2025-03-27
Payer: COMMERCIAL

## 2025-03-27 VITALS
HEART RATE: 74 BPM | DIASTOLIC BLOOD PRESSURE: 75 MMHG | OXYGEN SATURATION: 97 % | TEMPERATURE: 97.5 F | SYSTOLIC BLOOD PRESSURE: 138 MMHG

## 2025-03-27 DIAGNOSIS — N94.9 UNSPECIFIED CONDITION ASSOCIATED WITH FEMALE GENITAL ORGANS AND MENSTRUAL CYCLE: ICD-10-CM

## 2025-03-27 DIAGNOSIS — Z00.00 ENCOUNTER FOR GENERAL ADULT MEDICAL EXAMINATION W/OUT ABNORMAL FINDINGS: ICD-10-CM

## 2025-03-27 PROCEDURE — 99214 OFFICE O/P EST MOD 30 MIN: CPT

## 2025-03-27 RX ORDER — ESTRADIOL 0.1 MG/G
0.1 CREAM VAGINAL
Qty: 1 | Refills: 1 | Status: ACTIVE | COMMUNITY
Start: 2025-03-27 | End: 1900-01-01

## 2025-03-28 LAB
BILIRUB UR QL STRIP: NORMAL
CLARITY UR: CLEAR
COLLECTION METHOD: NORMAL
GLUCOSE UR-MCNC: NORMAL
HCG UR QL: 0.2 EU/DL
HGB UR QL STRIP.AUTO: NORMAL
KETONES UR-MCNC: NORMAL
LEUKOCYTE ESTERASE UR QL STRIP: NORMAL
NITRITE UR QL STRIP: NORMAL
PH UR STRIP: 5.5
PROT UR STRIP-MCNC: 30
SP GR UR STRIP: 1.01

## 2025-03-29 LAB — BACTERIA UR CULT: NORMAL

## 2025-04-01 ENCOUNTER — APPOINTMENT (OUTPATIENT)
Dept: OBGYN | Facility: CLINIC | Age: 60
End: 2025-04-01
Payer: COMMERCIAL

## 2025-04-01 VITALS
SYSTOLIC BLOOD PRESSURE: 133 MMHG | OXYGEN SATURATION: 100 % | HEART RATE: 66 BPM | DIASTOLIC BLOOD PRESSURE: 83 MMHG | BODY MASS INDEX: 35.96 KG/M2 | WEIGHT: 203 LBS

## 2025-04-01 DIAGNOSIS — Z01.419 ENCOUNTER FOR GYNECOLOGICAL EXAMINATION (GENERAL) (ROUTINE) W/OUT ABNORMAL FINDINGS: ICD-10-CM

## 2025-04-01 PROCEDURE — 99386 PREV VISIT NEW AGE 40-64: CPT

## 2025-04-07 LAB — CYTOLOGY CVX/VAG DOC THIN PREP: NORMAL

## 2025-04-09 ENCOUNTER — TRANSCRIPTION ENCOUNTER (OUTPATIENT)
Age: 60
End: 2025-04-09

## 2025-05-06 ENCOUNTER — APPOINTMENT (OUTPATIENT)
Dept: UROLOGY | Facility: CLINIC | Age: 60
End: 2025-05-06